# Patient Record
Sex: FEMALE | Race: ASIAN | NOT HISPANIC OR LATINO | Employment: UNEMPLOYED | URBAN - METROPOLITAN AREA
[De-identification: names, ages, dates, MRNs, and addresses within clinical notes are randomized per-mention and may not be internally consistent; named-entity substitution may affect disease eponyms.]

---

## 2020-04-27 ENCOUNTER — VIRTUAL VISIT (OUTPATIENT)
Dept: PEDIATRICS | Facility: CLINIC | Age: 35
End: 2020-04-27
Payer: COMMERCIAL

## 2020-04-27 DIAGNOSIS — M54.41 ACUTE MIDLINE LOW BACK PAIN WITH RIGHT-SIDED SCIATICA: Primary | ICD-10-CM

## 2020-04-27 PROCEDURE — 99203 OFFICE O/P NEW LOW 30 MIN: CPT | Mod: 95 | Performed by: PHYSICIAN ASSISTANT

## 2020-04-27 RX ORDER — CYCLOBENZAPRINE HCL 10 MG
10 TABLET ORAL 3 TIMES DAILY PRN
Qty: 15 TABLET | Refills: 0 | Status: SHIPPED | OUTPATIENT
Start: 2020-04-27 | End: 2020-08-26

## 2020-04-27 NOTE — PROGRESS NOTES
"Kirstin Green is a 34 year old female who is being evaluated via a billable video visit.      The patient has been notified of following:     \"This video visit will be conducted via a call between you and your physician/provider. We have found that certain health care needs can be provided without the need for an in-person physical exam.  This service lets us provide the care you need with a video conversation.  If a prescription is necessary we can send it directly to your pharmacy.  If lab work is needed we can place an order for that and you can then stop by our lab to have the test done at a later time.    Video visits are billed at different rates depending on your insurance coverage.  Please reach out to your insurance provider with any questions.    If during the course of the call the physician/provider feels a video visit is not appropriate, you will not be charged for this service.\"    Patient has given verbal consent for Video visit? Yes    How would you like to obtain your AVS? [email or web address]    Patient would like the video invitation sent by: Text to cell phone: [phone]    Will anyone else be joining your video visit?         Subjective     Kirstin Green is a 34 year old female who presents to clinic today for the following health issues:    HPI  Back Pain       Duration: 5 days          Specific cause: lifting--has young child    Description:   Location of pain: low back right  Character of pain: sharp and stabbing  Pain radiation:radiates into the right buttocks  New numbness or weakness in legs, not attributed to pain:  no     Intensity: Currently 5/10, moderate    History:   Pain interferes with job: YES--with ADL  History of back problems: recurrent self limited episodes of low back pain in the past  Any previous MRI or X-rays: None  Sees a specialist for back pain:  No  Therapies tried without relief: ibuprofen 200 mg and muscle pain balm--OTC    Alleviating factors:   Improved by: " nothing      Precipitating factors:  Worsened by: Lifting, Bending, Standing and cough, changing positions    Accompanying Signs & Symptoms:  Risk of Fracture:  None  Risk of Cauda Equina:  None  Risk of Infection:  None  Risk of Cancer:  None  Risk of Ankylosing Spondylitis:  Onset at age <35, male, AND morning back stiffness. 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     Review of Systems         Objective    There were no vitals taken for this visit.  There is no height or weight on file to calculate BMI.  Physical Exam     GENERAL: healthy, alert and no distress  EYES: Eyes grossly normal to inspection, conjunctivae and sclerae normal  PSYCH: mentation appears normal, affect normal/bright, judgement and insight intact, normal speech and appearance well-groomed    Diagnostic Test Results:  No results found for this or any previous visit (from the past 24 hour(s)).        Assessment & Plan   1. Acute midline low back pain with right-sided sciatica  Begin ibuprofen 400-600 mg three times daily. Heat at site. Modification of activities including lifting restrictions. Exercises sent via [employer].   Flexeril PRN. Call in two weeks if symptoms persist.   - cyclobenzaprine (FLEXERIL) 10 MG tablet; Take 1 tablet (10 mg) by mouth 3 times daily as needed  Dispense: 15 tablet; Refill: 0     Antoinette Christie PA-C  [facility]      Video-Visit Details    Type of service:  Video Visit    Video End Time: 11:17 AM    Originating Location (pt. Location): Home    Distant Location (provider location):  [facility]     Mode of Communication:  Video Conference via [employer]

## 2020-06-05 ENCOUNTER — OFFICE VISIT (OUTPATIENT)
Dept: OBGYN | Facility: CLINIC | Age: 35
End: 2020-06-05
Payer: COMMERCIAL

## 2020-06-05 VITALS — DIASTOLIC BLOOD PRESSURE: 62 MMHG | WEIGHT: 153 LBS | SYSTOLIC BLOOD PRESSURE: 114 MMHG

## 2020-06-05 DIAGNOSIS — L68.0 HIRSUTISM: Primary | ICD-10-CM

## 2020-06-05 PROCEDURE — 99202 OFFICE O/P NEW SF 15 MIN: CPT | Performed by: ADVANCED PRACTICE MIDWIFE

## 2020-06-05 SDOH — HEALTH STABILITY: MENTAL HEALTH: HOW OFTEN DO YOU HAVE A DRINK CONTAINING ALCOHOL?: NEVER

## 2020-06-05 NOTE — NURSING NOTE
Chief Complaint   Patient presents with     Hair Loss     hair loss on head, hair growth on chin       Initial /62 (BP Location: Right arm, Cuff Size: Adult Regular)   Wt 69.4 kg (153 lb)   LMP 05/05/2020   Breastfeeding Yes  There is no height or weight on file to calculate BMI.  BP completed using cuff size: regular    Questioned patient about current smoking habits.  Pt. has never smoked.

## 2020-06-05 NOTE — PROGRESS NOTES
SUBJECTIVE:                                                   Kirstin Green is a 34 year old who presents to clinic today for the following health issue(s):  Patient presents with:  Hair Loss: hair loss on head, hair growth on chin      HPI:  Patient presents with a few month history of increased facial hair, and increased male patter hair loss. Patient has not noted any other symptoms. She has a history of PCOS. Has taken birth control pills in the past to regulate her menses. Does not want to take them now as she is breastfeeding.    Patient's last menstrual period was 2020.  Menstrual History: frequency: every 28-30 days  Patient is sexually active, but reports low libido  .  Using tubal ligation for contraception.   Health maintenance updated:  yes    Last PHQ-9 score on record = No flowsheet data found.  Last GAD7 score on record = No flowsheet data found.      Problem list and histories reviewed & adjusted, as indicated.  Additional history: as documented.    There is no problem list on file for this patient.    Past Surgical History:   Procedure Laterality Date     TUBAL LIGATION  2018      Social History     Tobacco Use     Smoking status: Never Smoker     Smokeless tobacco: Never Used   Substance Use Topics     Alcohol use: Never     Frequency: Never           Current Outpatient Medications   Medication Sig     cyclobenzaprine (FLEXERIL) 10 MG tablet Take 1 tablet (10 mg) by mouth 3 times daily as needed (Patient not taking: Reported on 2020)     No current facility-administered medications for this visit.      Allergies   Allergen Reactions     Quinolones Rash       ROS:  CONSTITUTIONAL: NEGATIVE for fever, chills, change in weight  BREAST: NEGATIVE for masses, tenderness or discharge  GI: NEGATIVE for nausea, abdominal pain, heartburn, or change in bowel habits  : NEGATIVE for unusual urinary or vaginal symptoms. Periods are regular.  NEURO: NEGATIVE for weakness, dizziness or  paresthesias  ENDOCRINE: NEGATIVE for temperature intolerance  POSITIVE for increased facial hair, and increased male pattern hair loss  HEME/ALLERGY/IMMUNE: NEGATIVE for bleeding problems  PSYCHIATRIC: NEGATIVE for changes in mood or affect    OBJECTIVE:     /62 (BP Location: Right arm, Cuff Size: Adult Regular)   Wt 69.4 kg (153 lb)   LMP 05/05/2020   Breastfeeding Yes   There is no height or weight on file to calculate BMI.    PHYSICAL EXAM:  Constitutional:  Appearance: Well nourished, well developed alert, in no acute distress  Chest:  Respiratory Effort:  Breathing unlabored.   Neurologic:  Mental Status:  Oriented X3.  Normal strength and tone, sensory exam grossly normal, mentation intact and speech normal.    Psychiatric:  Mentation appears normal and affect normal/bright.     In-Clinic Test Results:  No results found for this or any previous visit (from the past 24 hour(s)).    ASSESSMENT/PLAN:                                                        ICD-10-CM    1. Hirsutism  L68.0 US Pelvic Complete w Transvaginal     **Testosterone Free and Total FUTURE anytime     Cortisol     DHEA sulfate     Prolactin     TSH with free T4 reflex     CANCELED: **Testosterone Free and Total FUTURE anytime     CANCELED: Cortisol - AM ([facility])     CANCELED: TSH with free T4 reflex     CANCELED: Prolactin     CANCELED: DHEA sulfate     PLAN:    Labs and ultrasound pending. Will advise patient of all results when available.    Encouraged patient to call with any questions or concerns.      SYLVIE Romo, CNM

## 2020-06-08 DIAGNOSIS — L68.0 HIRSUTISM: ICD-10-CM

## 2020-06-08 LAB
CORTIS SERPL-MCNC: 6.3 UG/DL (ref 4–22)
PROLACTIN SERPL-MCNC: 14 UG/L (ref 3–27)
TSH SERPL DL<=0.005 MIU/L-ACNC: 3.28 MU/L (ref 0.4–4)

## 2020-06-08 PROCEDURE — 84146 ASSAY OF PROLACTIN: CPT | Performed by: ADVANCED PRACTICE MIDWIFE

## 2020-06-08 PROCEDURE — 84270 ASSAY OF SEX HORMONE GLOBUL: CPT | Performed by: ADVANCED PRACTICE MIDWIFE

## 2020-06-08 PROCEDURE — 84443 ASSAY THYROID STIM HORMONE: CPT | Performed by: ADVANCED PRACTICE MIDWIFE

## 2020-06-08 PROCEDURE — 84403 ASSAY OF TOTAL TESTOSTERONE: CPT | Performed by: ADVANCED PRACTICE MIDWIFE

## 2020-06-08 PROCEDURE — 82627 DEHYDROEPIANDROSTERONE: CPT | Performed by: ADVANCED PRACTICE MIDWIFE

## 2020-06-08 PROCEDURE — 36415 COLL VENOUS BLD VENIPUNCTURE: CPT | Performed by: ADVANCED PRACTICE MIDWIFE

## 2020-06-08 PROCEDURE — 82533 TOTAL CORTISOL: CPT | Performed by: ADVANCED PRACTICE MIDWIFE

## 2020-06-09 LAB — DHEA-S SERPL-MCNC: 261 UG/DL (ref 35–430)

## 2020-06-10 LAB
SHBG SERPL-SCNC: 45 NMOL/L (ref 30–135)
TESTOST FREE SERPL-MCNC: 0.39 NG/DL (ref 0.13–0.92)
TESTOST SERPL-MCNC: 27 NG/DL (ref 8–60)

## 2020-06-24 ENCOUNTER — ANCILLARY PROCEDURE (OUTPATIENT)
Dept: ULTRASOUND IMAGING | Facility: CLINIC | Age: 35
End: 2020-06-24
Attending: ADVANCED PRACTICE MIDWIFE
Payer: COMMERCIAL

## 2020-06-24 PROCEDURE — 76856 US EXAM PELVIC COMPLETE: CPT | Performed by: FAMILY MEDICINE

## 2020-06-24 PROCEDURE — 76830 TRANSVAGINAL US NON-OB: CPT | Performed by: FAMILY MEDICINE

## 2020-06-26 ENCOUNTER — TELEPHONE (OUTPATIENT)
Dept: OBGYN | Facility: CLINIC | Age: 35
End: 2020-06-26

## 2020-06-26 DIAGNOSIS — E28.2 PCOS (POLYCYSTIC OVARIAN SYNDROME): Primary | ICD-10-CM

## 2020-06-26 DIAGNOSIS — N83.201 RIGHT OVARIAN CYST: ICD-10-CM

## 2020-06-26 RX ORDER — DESOGESTREL AND ETHINYL ESTRADIOL 0.15-0.03
1 KIT ORAL DAILY
Qty: 28 TABLET | Refills: 2 | Status: SHIPPED | OUTPATIENT
Start: 2020-06-26 | End: 2020-09-30

## 2020-06-26 NOTE — TELEPHONE ENCOUNTER
Called patient and advised that all of her labs were normal, and her ultrasound indicates PCOS. This is the likely cause of her facial hair and hair loss on her scalp. Also advised that she had a cyst on her right ovary that appeared benign, but follow up recommended to ensure resolution. Advised patient to schedule follow up ultrasound in 6-8 weeks.    Discussed option for treatment of PCOS. Patient decided that she would like to try a course of OCPs. Prescription sent to pharmacy. Patient has upcoming physical scheduled for follow up. Encouraged patient to call with any questions or concerns.      SYLVIE Romo, CNM

## 2020-08-07 ENCOUNTER — OFFICE VISIT (OUTPATIENT)
Dept: DERMATOLOGY | Facility: CLINIC | Age: 35
End: 2020-08-07
Payer: COMMERCIAL

## 2020-08-07 VITALS — OXYGEN SATURATION: 99 % | HEART RATE: 76 BPM | DIASTOLIC BLOOD PRESSURE: 59 MMHG | SYSTOLIC BLOOD PRESSURE: 95 MMHG

## 2020-08-07 DIAGNOSIS — L64.9 ANDROGENIC ALOPECIA: Primary | ICD-10-CM

## 2020-08-07 PROCEDURE — 99213 OFFICE O/P EST LOW 20 MIN: CPT | Performed by: PHYSICIAN ASSISTANT

## 2020-08-07 NOTE — PATIENT INSTRUCTIONS
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 Minoxidil - this is OTC  2. Spironolactone - blood pressure/anti-testosterone pill. Helps with unwanted hair growth too  3. Finasteride (propecia) - daily pill

## 2020-08-07 NOTE — PROGRESS NOTES
HPI:   Chief complaints: Kirstin Green is a 34 year old female who presents for evaluation of hair loss. She has noticed thinning along the front of her scalp for the past 3 years. The density seems to be the same at the back of the scalp and on the sides. NO pain on the scalp.   She has also noticed increased hair growth on the face which has been happening for about the same amount of time.   Condition present for:  3 years.   Previous treatments include: none  -pmhx: recent US which revealed likely PCOS. Labs were normal.   She is still nursing; has a 19 month old and a 9 year old    Review Of Systems  Eyes: negative  Ears/Nose/Throat: negative  Respiratory: No shortness of breath, dyspnea on exertion, cough, or hemoptysis  Cardiovascular: negative  Gastrointestinal: negative  Genitourinary: negative  Musculoskeletal: negative  Neurologic: negative  Psychiatric: negative  Skin: positive for hair loss       PHYSICAL EXAM:    BP 95/59   Pulse 76   SpO2 99%   Breastfeeding Yes   Skin exam performed as follows: Type 4 skin. Mood appropriate  Alert and Oriented X 3. Well developed, well nourished in no distress.  General appearance: Normal  Head including face: Normal  Eyes: conjunctiva and lids: Normal  Mouth: Lips, teeth, gums: Normal  Neck: Normal  Chest-breast/axillae: Normal  Back: Normal  Spleen and liver: Normal  Cardiovascular: Exam of peripheral vascular system by observation for swelling, varicosities, edema: Normal  Genitalia: groin, buttocks: Normal  Extremities: digits/nails (clubbing): Normal  Eccrine and Apocrine glands: Normal  Right upper extremity: Normal  Left upper extremity: Normal  Right lower extremity: Normal  Left lower extremity: Normal  Skin: Scalp and body hair: See below    1. Decreased density through vertex scalp. Scalp normal without erythema or scaling. Negative hair pull.     ASSESSMENT/PLAN:     1. Female pattern hair loss. Advised on natural course and progression secondary to  hormones and genetics. Discussed options for treatment including OTC minoxidil, spironolactone and finasteride. She is still nursing; advised she can call for medications once she is done with this.   2. Hirsutism - discussed that spironolactone may help with this.         Follow-up: yearly  CC:   Scribed By: Ayala Fung, MS, PA-C

## 2020-08-26 ENCOUNTER — OFFICE VISIT (OUTPATIENT)
Dept: PEDIATRICS | Facility: CLINIC | Age: 35
End: 2020-08-26
Payer: COMMERCIAL

## 2020-08-26 VITALS
HEART RATE: 103 BPM | OXYGEN SATURATION: 98 % | RESPIRATION RATE: 16 BRPM | SYSTOLIC BLOOD PRESSURE: 108 MMHG | DIASTOLIC BLOOD PRESSURE: 52 MMHG | TEMPERATURE: 98 F | WEIGHT: 158 LBS

## 2020-08-26 DIAGNOSIS — M54.42 ACUTE LEFT-SIDED LOW BACK PAIN WITH LEFT-SIDED SCIATICA: ICD-10-CM

## 2020-08-26 PROCEDURE — 99213 OFFICE O/P EST LOW 20 MIN: CPT | Performed by: PHYSICIAN ASSISTANT

## 2020-08-26 RX ORDER — CYCLOBENZAPRINE HCL 10 MG
10 TABLET ORAL 3 TIMES DAILY PRN
Qty: 15 TABLET | Refills: 0 | Status: SHIPPED | OUTPATIENT
Start: 2020-08-26 | End: 2021-11-03

## 2020-08-26 NOTE — PROGRESS NOTES
Subjective     Kirstin Green is a 35 year old female who presents to clinic today for the following health issues:    HPI   Musculoskeletal problem/pain  Onset/Duration: April 2020   Description  Location: lower back down into L leg  Joint Swelling: no  Redness: no  Pain: YES  Warmth: no  Intensity:  moderate  Progression of Symptoms:  worsening and intermittent  Accompanying signs and symptoms:   Fevers: no  Numbness/tingling/weakness: tingling  History  Trauma to the area: no  Recent illness:  no  Previous similar problem: YES  Previous evaluation:  no  Precipitating or alleviating factors:  Aggravating factors include: none and exercise  Therapies tried and outcome: nothing and Ibuprofen    Review of Systems   Constitutional, HEENT, cardiovascular, pulmonary, gi and gu systems are negative, except as otherwise noted.      Objective    /52   Pulse 103   Temp 98  F (36.7  C) (Tympanic)   Resp 16   Wt 71.7 kg (158 lb)   SpO2 98%   There is no height or weight on file to calculate BMI.  Physical Exam   ORTHO: Lumber/Thoracic Spine Exam: Tender:  lumbar spinous processes, left para lumbar muscles  Range of Motion:  lumbar flexion  decreased, left lateral lumbar bending  decreased, right lateral lumbar bending  decreased  Strength:  Full  Special tests:  positive left straight leg raise    No results found for this or any previous visit (from the past 24 hour(s)).        Assessment & Plan   (M54.42) Acute left-sided low back pain with left-sided sciatica  Comment: begin good mechanics. Heat/ice, gentle stretches. Ibuprofen. Flexeril PRN. Call if symptoms persist or worsen.  Plan: cyclobenzaprine (FLEXERIL) 10 MG tablet          Antoinette Christie PA-C  [facility]

## 2020-09-21 ENCOUNTER — TELEPHONE (OUTPATIENT)
Dept: OBGYN | Facility: CLINIC | Age: 35
End: 2020-09-21

## 2020-09-21 DIAGNOSIS — O92.29 PAIN OF BREAST DURING BREASTFEEDING: Primary | ICD-10-CM

## 2020-09-21 DIAGNOSIS — Z91.89 BREASTFEEDING PROBLEM: Primary | ICD-10-CM

## 2020-09-21 RX ORDER — BACITRACIN ZINC 500 [USP'U]/G
OINTMENT TOPICAL 2 TIMES DAILY
Qty: 1 G | Refills: 0 | Status: SHIPPED | OUTPATIENT
Start: 2020-09-21 | End: 2021-11-03

## 2020-09-21 RX ORDER — MODIFIED LANOLIN
OINTMENT (GRAM) TOPICAL
Qty: 7 G | Refills: 1 | Status: SHIPPED | OUTPATIENT
Start: 2020-09-21 | End: 2021-11-03

## 2020-09-21 NOTE — TELEPHONE ENCOUNTER
Patient would like call back regarding painful, swollen,cracked nipples while breast feeding. Scheduled appt. 9/24 with midwife.

## 2020-09-21 NOTE — TELEPHONE ENCOUNTER
Called and spoke with pt. She is currently 20 months postpartum, breastfeeding her infant only in evenings, has been working on weaning. Has been feeding only on the right side. For the past few days has noticed that the nipple on that side is sore and cracked. Denies any exudate or drainage. Denies any redness of breast but reports some redness of nipple. Denies fevers, chills, swollen/painful lumps. Infant is feeding normally, no observed discomfort or white patches in infant's mouth. Has not tried putting anything on nipple yet. Recommended bacitracin to affected nipple twice a day as well as lanolin ointment after feeds. Can also use tylenol or ibuprofen, heat/cool application as desired. Reviewed s/sx of infection, pt to call with new/worsening symptoms. Has appointment scheduled for 9/24.     PREET Wlal CNM 9/21/2020 11:37 AM

## 2020-09-24 ENCOUNTER — OFFICE VISIT (OUTPATIENT)
Dept: OBGYN | Facility: CLINIC | Age: 35
End: 2020-09-24
Payer: COMMERCIAL

## 2020-09-24 VITALS — SYSTOLIC BLOOD PRESSURE: 112 MMHG | WEIGHT: 158 LBS | DIASTOLIC BLOOD PRESSURE: 56 MMHG

## 2020-09-24 DIAGNOSIS — Z23 NEED FOR PROPHYLACTIC VACCINATION AND INOCULATION AGAINST INFLUENZA: ICD-10-CM

## 2020-09-24 DIAGNOSIS — Z91.89 BREASTFEEDING PROBLEM: Primary | ICD-10-CM

## 2020-09-24 PROCEDURE — 90686 IIV4 VACC NO PRSV 0.5 ML IM: CPT | Performed by: ADVANCED PRACTICE MIDWIFE

## 2020-09-24 PROCEDURE — 99213 OFFICE O/P EST LOW 20 MIN: CPT | Mod: 25 | Performed by: ADVANCED PRACTICE MIDWIFE

## 2020-09-24 PROCEDURE — 90471 IMMUNIZATION ADMIN: CPT | Performed by: ADVANCED PRACTICE MIDWIFE

## 2020-09-24 NOTE — PROGRESS NOTES
SUBJECTIVE:                                                   Kirstin Green is a 35 year old who presents to clinic today for the following health issue(s):  Patient presents with:  Breast Problem: cuts and swelling on rt nipple, lanolin and bacitracin have decreased swelling  Imm/Inj: Flu Shot      HPI:  Here with 2 cracks, mild swelling of her right nipple, she is breastfeeding her 20 month old, trying to wean, only feeds once at night currently. Denies any fevers, chills, drainage, lesions. Infant has been feeding normally, no white patches or sx of infection. Called on 9/22 and had a prescription for antibiotic ointment and lanolin rx'd to pharmacy, she reports that the swelling has improved after this.     Patient's last menstrual period was 08/05/2020.     Problem list and histories reviewed & adjusted, as indicated.  Additional history: as documented.    There is no problem list on file for this patient.    Past Surgical History:   Procedure Laterality Date     TUBAL LIGATION  12/2018      Social History     Tobacco Use     Smoking status: Never Smoker     Smokeless tobacco: Never Used   Substance Use Topics     Alcohol use: Never     Frequency: Never      Problem (# of Occurrences) Relation (Name,Age of Onset)    Breast Cancer (1) Maternal Grandmother            Current Outpatient Medications   Medication Sig     bacitracin 500 UNIT/GM external ointment Apply topically 2 times daily Apply following breastfeeding and allow to absorb prior to next feed.     desogestrel-ethinyl estradiol (APRI) 0.15-30 MG-MCG tablet Take 1 tablet by mouth daily     lanolin ointment Apply topically every hour as needed for dry skin     cyclobenzaprine (FLEXERIL) 10 MG tablet Take 1 tablet (10 mg) by mouth 3 times daily as needed (Patient not taking: Reported on 9/24/2020)     No current facility-administered medications for this visit.      Allergies   Allergen Reactions     Quinolones Rash       ROS:  CONSTITUTIONAL: NEGATIVE  for fever, chills, change in weight  INTEGUMENTARU/SKIN: NEGATIVE for worrisome rashes, moles or lesions  BREAST: NEGATIVE for masses, tenderness or discharge, positive for cracking of skin on right nipple  : NEGATIVE for unusual urinary or vaginal symptoms. Periods are regular.  NEURO: NEGATIVE for weakness, dizziness or paresthesias  PSYCHIATRIC: NEGATIVE for changes in mood or affect    OBJECTIVE:     /56 (BP Location: Left arm, Cuff Size: Adult Regular)   Wt 71.7 kg (158 lb)   LMP 08/05/2020   Breastfeeding Yes   There is no height or weight on file to calculate BMI.    PHYSICAL EXAM:  Constitutional:  Appearance: Well nourished, well developed alert, in no acute distress  Breasts:  Inspection of Breasts:  Symmetric bilaterally.  No puckering.  No skin changes.  Palpation of Breasts and Axillae:  No masses present on palpation, no breast tenderness Axillary Lymph Nodes:  No lymphadenopathy present. 2 small, superficial cracks on right nipple. No bleeding, no drainage, warmth or erythema. Tenderness to palpation.  Psychiatric:  Mentation appears normal and affect normal/bright.     In-Clinic Test Results:  No results found for this or any previous visit (from the past 24 hour(s)).    ASSESSMENT/PLAN:                                                        ICD-10-CM    1. Breastfeeding problem  Z91.89    2. Need for prophylactic vaccination and inoculation against influenza  Z23 INFLUENZA VACCINE IM > 6 MONTHS VALENT IIV4 [06612]     Vaccine Administration, Initial [90296]       PLAN:  Continue with antibiotic ointment, sx improving  Reviewed methods for helping to wean infant   Reviewed s/sx of infection, return precautions discussed    PREET Wall CNM 9/24/2020 4:59 PM

## 2020-09-24 NOTE — NURSING NOTE
Chief Complaint   Patient presents with     Breast Problem     cuts and swelling on rt nipple, lanolin and bacitracin have decreased swelling     Imm/Inj     Flu Shot       Initial /56 (BP Location: Left arm, Cuff Size: Adult Regular)   Wt 71.7 kg (158 lb)   LMP 2020   Breastfeeding Yes  There is no height or weight on file to calculate BMI.  BP completed using cuff size: regular    Questioned patient about current smoking habits.  Pt. has never smoked.          The following HM Due: NONE  Marleny Mendoza CMA    
no

## 2020-09-29 ENCOUNTER — TELEPHONE (OUTPATIENT)
Dept: OBGYN | Facility: CLINIC | Age: 35
End: 2020-09-29

## 2020-09-29 DIAGNOSIS — E28.2 PCOS (POLYCYSTIC OVARIAN SYNDROME): ICD-10-CM

## 2020-09-29 NOTE — TELEPHONE ENCOUNTER
isibloom      Last Written Prescription Date:  6/26/20  Last Fill Quantity: 28,   # refills: 2  Last Office Visit: 9/24/20  Future Office visit:

## 2020-09-29 NOTE — TELEPHONE ENCOUNTER
"Requested Prescriptions   Pending Prescriptions Disp Refills     desogestrel-ethinyl estradiol (APRI) 0.15-30 MG-MCG tablet 28 tablet 2     Sig: Take 1 tablet by mouth daily       Contraceptives Protocol Passed - 9/29/2020  1:31 PM        Passed - Patient is not a current smoker if age is 35 or older        Passed - Recent (12 mo) or future (30 days) visit within the authorizing provider's specialty     Patient has had an office visit with the authorizing provider or a provider within the authorizing providers department within the previous 12 mos or has a future within next 30 days. See \"Patient Info\" tab in inbasket, or \"Choose Columns\" in Meds & Orders section of the refill encounter.              Passed - Medication is active on med list        Passed - No active pregnancy on record        Passed - No positive pregnancy test in past 12 months           LM to see if pt is breastfeeding.    Evelia BRITO R.N.        "

## 2020-09-30 RX ORDER — DESOGESTREL AND ETHINYL ESTRADIOL 0.15-0.03
1 KIT ORAL DAILY
Qty: 28 TABLET | Refills: 2 | Status: SHIPPED | OUTPATIENT
Start: 2020-09-30 | End: 2021-01-13

## 2020-09-30 NOTE — TELEPHONE ENCOUNTER
Fax received from [employer].  [facility]om is not covered without PA.  Is there an alternative or do you want to start a PA?    Opal Mayers RN

## 2020-10-05 ENCOUNTER — TELEPHONE (OUTPATIENT)
Dept: PEDIATRICS | Facility: CLINIC | Age: 35
End: 2020-10-05

## 2020-10-05 DIAGNOSIS — M54.42 ACUTE LEFT-SIDED LOW BACK PAIN WITH LEFT-SIDED SCIATICA: Primary | ICD-10-CM

## 2020-10-05 NOTE — TELEPHONE ENCOUNTER
Call patient. Referral placed. If no improvement in symptoms with physical therapy, will refer to ortho.     Antoinette Christie PA-C

## 2020-10-05 NOTE — TELEPHONE ENCOUNTER
ML for pt. Regarding Debora BOYD's note. Advised pt. To call back with any questions or concerns  Lulu Villalobos MA on 10/5/2020 at 12:18 PM

## 2020-10-05 NOTE — TELEPHONE ENCOUNTER
Any generic equivalent to desogestrel-ethinyl estradiol 0.15-30MG-MCG would be appropriate.      Thank you,    SYLVIE Romo, MARYJANEM

## 2020-10-05 NOTE — TELEPHONE ENCOUNTER
Order/Referral Request:    Who is requesting: patient/patient's     Orders being requested: Referral for physical therapy    Reason service is needed/diagnosis: Continued back pain    When are orders needed by: at convenience    Has this been discussed with Provider: Yes    Does patient have a preference on a Group/Provider/Facility? n    Does patient have an appointment scheduled: No - had appt. 8/26/20 with Cheryl Christie PA-C    Where to send Orders: [facility]    Okay to leave detailed message?  Yes at Home number on file [phone] (home)

## 2020-10-05 NOTE — TELEPHONE ENCOUNTER
Patients  called stating that pt is still having back issue and would like to get a referral for Physical therapy. Caller stated that pt was advised to call back and request the referral if the back pain does not get better. Please reach out to pt for concerns.     Tricia Brandon on 10/5/2020 at 10:27 AM

## 2020-10-06 NOTE — TELEPHONE ENCOUNTER
Patient returned call.  Provided patient with scheduling number for PT, and transferred for scheduling.    Rosie Hirsch

## 2020-10-07 ENCOUNTER — THERAPY VISIT (OUTPATIENT)
Dept: PHYSICAL THERAPY | Facility: CLINIC | Age: 35
End: 2020-10-07
Attending: PHYSICIAN ASSISTANT
Payer: COMMERCIAL

## 2020-10-07 DIAGNOSIS — G89.29 CHRONIC BILATERAL LOW BACK PAIN WITH LEFT-SIDED SCIATICA: Primary | ICD-10-CM

## 2020-10-07 DIAGNOSIS — M54.42 ACUTE LEFT-SIDED LOW BACK PAIN WITH LEFT-SIDED SCIATICA: ICD-10-CM

## 2020-10-07 DIAGNOSIS — M54.42 CHRONIC BILATERAL LOW BACK PAIN WITH LEFT-SIDED SCIATICA: Primary | ICD-10-CM

## 2020-10-07 PROCEDURE — 97110 THERAPEUTIC EXERCISES: CPT | Mod: GP | Performed by: PHYSICAL THERAPIST

## 2020-10-07 PROCEDURE — 97161 PT EVAL LOW COMPLEX 20 MIN: CPT | Mod: GP | Performed by: PHYSICAL THERAPIST

## 2020-10-07 NOTE — PROGRESS NOTES
[facility] Initial Evaluation  Subjective:  The history is provided by the patient. No  was used.   Patient Health History    Problem began: 4/1/2020.   Problem occurred: Pt was carrying child (22mos) and heard a sound in her back and felt pain immediately in R side of low back.  Pt was prescribed muscle relaxants and things settled down after 1-2 months.  Pt then went to the gym and did some treadmill and skipping and noticed pain the next morning down the L leg to her ankle.  Pt now primarily has sympotms when carrying her son, located L side low back and L posterior thigh.   Pain is reported as 9/10 on pain scale.  General health as reported by patient is good.     Red flags:  None as reported by patient.             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                     Therapist Generated HPI Evaluation  Problem details: Previous history of low back pain during second pregnancy.         Type of problem:  Lumbar.      Condition occurred with:  Insidious onset.    Patient reports pain:  SI joint left.    Pain radiates to:  Thigh left.     Associated symptoms:  Numbness. Symptoms are exacerbated by carrying and sitting  Relieved by: lying down.                              Objective:  System    Physical Exam      [place] Lumbar Evaluation    Posture:  Sitting: fair  Standing: fair  Lordosis: WNL  Lateral Shift: no  Correction of Posture: no effect    Movement Loss:  Flexion (Flex): min  Extension (EXT): mod  Side Glide R (SG R): min  Side Glide L (SG L): mod and pain  Test Movements:        EIL:   Repeat EIL: During: produces  After: no worse  Mechanical Response: IncROM        Conclusion: derangement  Principle of Treatment:      Extension: REIL with hips offset to the R - decreased pulling in L posterior thigh during forward flexion afterwards                                           ROS    Assessment/Plan:    Patient is a 35 year old  female with lumbar complaints.    Patient has the following significant findings with corresponding treatment plan.                Diagnosis 1:  Low back and L leg pain      Pain -  hot/cold therapy, manual therapy, self management, education, directional preference exercise and home program  Decreased ROM/flexibility - manual therapy and therapeutic exercise  Decreased strength - therapeutic exercise and therapeutic activities  Impaired muscle performance - neuro re-education  Decreased function - therapeutic activities  Impaired posture - neuro re-education    Therapy Evaluation Codes:   1) History comprised of:   Personal factors that impact the plan of care:      Gender, Language and Time since onset of symptoms.    Comorbidity factors that impact the plan of care are:      None.     Medications impacting care: None.  2) Examination of Body Systems comprised of:   Body structures and functions that impact the plan of care:      Hip, Knee and Lumbar spine.   Activity limitations that impact the plan of care are:      Bending, Cooking, Lifting and Sitting.  3) Clinical presentation characteristics are:   Evolving/Changing.  4) Decision-Making    Low complexity using standardized patient assessment instrument and/or measureable assessment of functional outcome.  Cumulative Therapy Evaluation is: Low complexity.    Previous and current functional limitations:  (See Goal Flow Sheet for this information)    Short term and Long term goals: (See Goal Flow Sheet for this information)     Communication ability:  Patient appears to be able to clearly communicate and understand verbal and written communication and follow directions correctly.  Treatment Explanation - The following has been discussed with the patient:   RX ordered/plan of care  Anticipated outcomes  Possible risks and side effects  This patient would benefit from PT intervention to resume normal activities.   Rehab potential is good.    Frequency:  1 X week,  once daily  Duration:  for 6 weeks  Discharge Plan:  Achieve all LTG.  Independent in home treatment program.  Reach maximal therapeutic benefit.    Please refer to the daily flowsheet for treatment today, total treatment time and time spent performing 1:1 timed codes.

## 2020-10-21 DIAGNOSIS — N83.201 RIGHT OVARIAN CYST: ICD-10-CM

## 2021-01-04 ENCOUNTER — HEALTH MAINTENANCE LETTER (OUTPATIENT)
Age: 36
End: 2021-01-04

## 2021-01-13 DIAGNOSIS — E28.2 PCOS (POLYCYSTIC OVARIAN SYNDROME): ICD-10-CM

## 2021-01-13 RX ORDER — DESOGESTREL AND ETHINYL ESTRADIOL 0.15-0.03
1 KIT ORAL DAILY
Qty: 84 TABLET | Refills: 3 | Status: SHIPPED | OUTPATIENT
Start: 2021-01-13 | End: 2021-11-03

## 2021-01-13 NOTE — TELEPHONE ENCOUNTER
Prescription approved per [facility] Refill Protocol.    Opal Mayers RN

## 2021-01-13 NOTE — TELEPHONE ENCOUNTER
Isibloom 0.15MG - 0.03 tabs 28S      Last Written Prescription Date:  Last refilled 12/15/2020  Last Fill Quantity: 28,   # refills: 2  Last Office Visit: 9/24/20  Future Office visit:   None scheduled    Claritza Borjas CMA

## 2021-04-09 ENCOUNTER — TELEPHONE (OUTPATIENT)
Dept: PEDIATRICS | Facility: CLINIC | Age: 36
End: 2021-04-09

## 2021-04-09 NOTE — TELEPHONE ENCOUNTER
The Pt and her  called in (I did receive verbal permission from the Pt it was ok to speak with her  about medical information).   The Pt wanted to know if it is safe to receive the Covid-19 vaccine while breast feeding. I did advise her our OB providers are advising breastfeeding moms to receive the vaccine.     No further questions or concerns at this time.     Pebbles Brennan RN   [facility] -- Triage Nurse

## 2021-04-28 ENCOUNTER — IMMUNIZATION (OUTPATIENT)
Dept: NURSING | Facility: CLINIC | Age: 36
End: 2021-04-28
Payer: COMMERCIAL

## 2021-04-28 PROCEDURE — 0001A PR COVID VAC PFIZER DIL RECON 30 MCG/0.3 ML IM: CPT

## 2021-04-28 PROCEDURE — 91300 PR COVID VAC PFIZER DIL RECON 30 MCG/0.3 ML IM: CPT

## 2021-05-19 ENCOUNTER — IMMUNIZATION (OUTPATIENT)
Dept: NURSING | Facility: CLINIC | Age: 36
End: 2021-05-19
Attending: INTERNAL MEDICINE
Payer: COMMERCIAL

## 2021-05-19 PROCEDURE — 0002A PR COVID VAC PFIZER DIL RECON 30 MCG/0.3 ML IM: CPT

## 2021-05-19 PROCEDURE — 91300 PR COVID VAC PFIZER DIL RECON 30 MCG/0.3 ML IM: CPT

## 2021-08-26 ENCOUNTER — VIRTUAL VISIT (OUTPATIENT)
Dept: FAMILY MEDICINE | Facility: OTHER | Age: 36
End: 2021-08-26
Payer: COMMERCIAL

## 2021-08-26 DIAGNOSIS — J06.9 VIRAL URI WITH COUGH: Primary | ICD-10-CM

## 2021-08-26 PROCEDURE — 99213 OFFICE O/P EST LOW 20 MIN: CPT | Mod: 95 | Performed by: FAMILY MEDICINE

## 2021-08-26 ASSESSMENT — PAIN SCALES - GENERAL: PAINLEVEL: NO PAIN (0)

## 2021-08-26 NOTE — PROGRESS NOTES
Kirstin is a 36 year old who is being evaluated via a billable telephone visit.      What phone number would you like to be contacted at? [phone]    How would you like to obtain your AVS? MyChart    Assessment & Plan     1. Viral URI with cough  Symptoms consistent with possible viral URI.  No red flag signs.  Son has recently been tested for COVID and was negative.  Discussed symptomatic care  Reportable signs and symptoms discussed.  RTC if symptoms persist or fail to improve      Lizbeth Lynne MD  [facility]   Kirstin is a 36 year old who presents for the following health issues     HPI     Acute Illness  Acute illness concerns:   Onset/Duration: 2 days   Symptoms:  Fever: YES  Chills/Sweats: YES  Headache (location?): YES  Sinus Pressure: no  Conjunctivitis:  no  Ear Pain: no  Rhinorrhea: no  Congestion: no  Sore Throat: YES  Cough: YES-non-productive  Wheeze: no  Decreased Appetite: no  Nausea: no  Vomiting: no  Diarrhea: no  Dysuria/Freq.: no  Dysuria or Hematuria: no  Fatigue/Achiness: YES  Sick/Strep Exposure: no  Therapies tried and outcome: Dayquil- helped      Review of Systems   Constitutional, HEENT, cardiovascular, pulmonary, gi and gu systems are negative, except as otherwise noted.      Objective    Vitals - Patient Reported  Pain Score: No Pain (0)      Vitals:  No vitals were obtained today due to virtual visit.    Physical Exam   healthy, alert and no distress  PSYCH: Alert and oriented times 3; coherent speech, normal   rate and volume, able to articulate logical thoughts, able   to abstract reason, no tangential thoughts, no hallucinations   or delusions  Her affect is normal  RESP: No cough, no audible wheezing, able to talk in full sentences  Remainder of exam unable to be completed due to telephone visits          Phone call duration: 9 minutes

## 2021-10-10 ENCOUNTER — HEALTH MAINTENANCE LETTER (OUTPATIENT)
Age: 36
End: 2021-10-10

## 2021-11-03 ENCOUNTER — OFFICE VISIT (OUTPATIENT)
Dept: OBGYN | Facility: CLINIC | Age: 36
End: 2021-11-03
Payer: COMMERCIAL

## 2021-11-03 VITALS — WEIGHT: 179.5 LBS | DIASTOLIC BLOOD PRESSURE: 70 MMHG | SYSTOLIC BLOOD PRESSURE: 122 MMHG

## 2021-11-03 DIAGNOSIS — N93.9 ABNORMAL UTERINE BLEEDING (AUB): ICD-10-CM

## 2021-11-03 DIAGNOSIS — L68.0 HIRSUTISM: Primary | ICD-10-CM

## 2021-11-03 PROCEDURE — 99214 OFFICE O/P EST MOD 30 MIN: CPT | Performed by: OBSTETRICS & GYNECOLOGY

## 2021-11-03 PROCEDURE — 84443 ASSAY THYROID STIM HORMONE: CPT | Performed by: OBSTETRICS & GYNECOLOGY

## 2021-11-03 PROCEDURE — 84270 ASSAY OF SEX HORMONE GLOBUL: CPT | Performed by: OBSTETRICS & GYNECOLOGY

## 2021-11-03 PROCEDURE — 82627 DEHYDROEPIANDROSTERONE: CPT | Performed by: OBSTETRICS & GYNECOLOGY

## 2021-11-03 PROCEDURE — 80053 COMPREHEN METABOLIC PANEL: CPT | Performed by: OBSTETRICS & GYNECOLOGY

## 2021-11-03 PROCEDURE — 84403 ASSAY OF TOTAL TESTOSTERONE: CPT | Performed by: OBSTETRICS & GYNECOLOGY

## 2021-11-03 PROCEDURE — 36415 COLL VENOUS BLD VENIPUNCTURE: CPT | Performed by: OBSTETRICS & GYNECOLOGY

## 2021-11-03 RX ORDER — NORGESTIMATE AND ETHINYL ESTRADIOL 0.25-0.035
1 KIT ORAL DAILY
Qty: 84 TABLET | Refills: 4 | Status: SHIPPED | OUTPATIENT
Start: 2021-11-03

## 2021-11-03 NOTE — PROGRESS NOTES
SUBJECTIVE:   CC: hirsutism, AUB                                               Kirstin Green is a 36 year old  female who presents to clinic today for new AUB and persistent hirsutism. LMP 10/13 through 10/29, then spotting 2 days later. This is the first episode of prolonged bleeding. Periods have been historically normal though she does report diagnosis with PCOS in [place] in .   She does report hirsutism with increasing coarse hair growth which used to be confined to under her chin on one side, now is across the whole jaw and cheeks. She was previously on oral contraceptives and noted worsening of symptoms since stopping. She stopped because she did not like her most recent formulation, which included desogestrel, she preferred her previous combined oral contraceptives.  US from 2020 does show multiple peripheral follicles.     Problem list and histories reviewed & adjusted, as indicated.  Additional history: as documented.    ROS:  Const: 20lb weight gain in the last year     Patient Active Problem List   Diagnosis     Chronic bilateral low back pain with left-sided sciatica     Past Surgical History:   Procedure Laterality Date     TUBAL LIGATION  2018      Social History     Tobacco Use     Smoking status: Never Smoker     Smokeless tobacco: Never Used   Substance Use Topics     Alcohol use: Never      Problem (# of Occurrences) Relation (Name,Age of Onset)    Breast Cancer (1) Maternal Grandmother            No current outpatient medications on file prior to visit.  No current facility-administered medications on file prior to visit.    Allergies   Allergen Reactions     Quinolones Rash       OBJECTIVE:   /70   Wt 81.4 kg (179 lb 8 oz)   LMP 10/13/2021    Const: sitting in chair in no acute distress, comfortable  HEENT: course hair not visible, has been removed  Eyes: no scleral icterus, EOMI  CV: regular rate, well perfused  Pulm: no increased work of breathing, no cough  Skin: warm and  dry, no rashes/lesions  Psych: mood stable, appropriate affect  Abd: soft, no hepatosplenomegaly, non-tender to palpation  Neuro: A+Ox3     ASSESSMENT/PLAN:                                                    Kirstin Green is a 36 year old female  here for initial gyn evaluation of AUB and hirsutism. I suspect both symptoms arise from PCOS, particularly as they have worsened since she stopped her oral contraceptives.   Discussed polycystic ovarian syndrome with Kirstin Green. Explained that polycystic ovary syndrome (PCOS) is an important cause of both menstrual irregularity and androgen excess in women. Discussed that it can cause irregular menstrual cycles, hirsutism, obesity, insulin resistance, and anovulatory infertility.   Counseled Kirstin Green that diet and exercise for weight reduction are the first steps for overweight and obese women with PCOS. Weight loss can restore ovulatory cycles and improve metabolic risk. Also discussed that a lower carb diet is often beneficial. I recommended that she consider a modified and less strict version of Whole30 or Paleo, or a diabetic diet, with an emphasis on whole, unprocessed foods and high-quality carbohydrate sources, like vegetables, fruits, and complex, unprocessed grains.  Discussed that oral contraceptives (OCs) are the mainstay of pharmacologic therapy for women with PCOS for managing hyperandrogenism, treating menstrual dysfunction and protecting endometrial lining. Additionally, these provide contraception.   For women with PCOS who choose not to or cannot take OCs, we discussed alternative treatments for endometrial protection including intermittent or continuous progestin therapy, or a progestin-releasing intrauterine device (IUD). Depo provera, Nuvaring, the patch, and Nexplanon may also provide this protection in addition to contraception.  Additionally discussed spironolactone as an alternative or addition to manage hirsutism, but reviewed that oral  contraceptives are first line therapy.   Plan for now is to repeat androgen testing and restart ocps.     1. Abnormal uterine bleeding (AUB)  - DHEA sulfate; Future  - Testosterone Free and Total; Future  - Comprehensive metabolic panel; Future  - norgestimate-ethinyl estradiol (ORTHO-CYCLEN, 28,) 0.25-35 MG-MCG tablet; Take 1 tablet by mouth daily  Dispense: 84 tablet; Refill: 4  - TSH with free T4 reflex; Future  - DHEA sulfate  - Testosterone Free and Total  - Comprehensive metabolic panel  - TSH with free T4 reflex  - pelvic US     2. Hirsutism  - DHEA sulfate; Future  - Testosterone Free and Total; Future  - Comprehensive metabolic panel; Future  - norgestimate-ethinyl estradiol (ORTHO-CYCLEN, 28,) 0.25-35 MG-MCG tablet; Take 1 tablet by mouth daily  Dispense: 84 tablet; Refill: 4  - DHEA sulfate  - Testosterone Free and Total  - Comprehensive metabolic panel           Lauryn Hansen MD  Obstetrics and Gynecology   [facility]

## 2021-11-04 LAB
ALBUMIN SERPL-MCNC: 3.8 G/DL (ref 3.4–5)
ALP SERPL-CCNC: 89 U/L (ref 40–150)
ALT SERPL W P-5'-P-CCNC: 29 U/L (ref 0–50)
ANION GAP SERPL CALCULATED.3IONS-SCNC: 7 MMOL/L (ref 3–14)
AST SERPL W P-5'-P-CCNC: 13 U/L (ref 0–45)
BILIRUB SERPL-MCNC: 0.2 MG/DL (ref 0.2–1.3)
BUN SERPL-MCNC: 11 MG/DL (ref 7–30)
CALCIUM SERPL-MCNC: 8.9 MG/DL (ref 8.5–10.1)
CHLORIDE BLD-SCNC: 106 MMOL/L (ref 94–109)
CO2 SERPL-SCNC: 24 MMOL/L (ref 20–32)
CREAT SERPL-MCNC: 0.85 MG/DL (ref 0.52–1.04)
DHEA-S SERPL-MCNC: 206 UG/DL (ref 35–430)
GFR SERPL CREATININE-BSD FRML MDRD: 88 ML/MIN/1.73M2
GLUCOSE BLD-MCNC: 88 MG/DL (ref 70–99)
POTASSIUM BLD-SCNC: 3.8 MMOL/L (ref 3.4–5.3)
PROT SERPL-MCNC: 7.9 G/DL (ref 6.8–8.8)
SHBG SERPL-SCNC: 29 NMOL/L (ref 30–135)
SODIUM SERPL-SCNC: 137 MMOL/L (ref 133–144)
TSH SERPL DL<=0.005 MIU/L-ACNC: 2.2 MU/L (ref 0.4–4)

## 2021-11-04 NOTE — PATIENT INSTRUCTIONS
Consider laser hair removal for unwanted coarse facial hair.     Consider dermatology consultation for hair lightening techniques.

## 2021-11-07 LAB
SHBG SERPL-SCNC: 29 NMOL/L (ref 30–135)
TESTOST FREE SERPL-MCNC: 1.33 NG/DL
TESTOST SERPL-MCNC: 68 NG/DL (ref 8–60)

## 2021-11-17 ENCOUNTER — ANCILLARY PROCEDURE (OUTPATIENT)
Dept: ULTRASOUND IMAGING | Facility: CLINIC | Age: 36
End: 2021-11-17
Attending: OBSTETRICS & GYNECOLOGY
Payer: COMMERCIAL

## 2021-11-17 DIAGNOSIS — N93.9 ABNORMAL UTERINE BLEEDING (AUB): ICD-10-CM

## 2021-11-17 PROCEDURE — 76830 TRANSVAGINAL US NON-OB: CPT | Performed by: OBSTETRICS & GYNECOLOGY

## 2021-11-17 PROCEDURE — 76856 US EXAM PELVIC COMPLETE: CPT | Performed by: OBSTETRICS & GYNECOLOGY

## 2022-01-30 ENCOUNTER — HEALTH MAINTENANCE LETTER (OUTPATIENT)
Age: 37
End: 2022-01-30

## 2022-09-18 ENCOUNTER — HEALTH MAINTENANCE LETTER (OUTPATIENT)
Age: 37
End: 2022-09-18

## 2023-03-02 NOTE — LETTER
8/7/2020         RE: Kirstin Green  [address]  [place] [address]        Dear Colleague,    [facility] Please see a copy of my visit note below.    HPI:   Chief complaints: Kirstin Green is a 34 year old female who presents for evaluation of hair loss. She has noticed thinning along the front of her scalp for the past 3 years. The density seems to be the same at the back of the scalp and on the sides. NO pain on the scalp.   She has also noticed increased hair growth on the face which has been happening for about the same amount of time.   Condition present for:  3 years.   Previous treatments include: none  -pmhx: recent US which revealed likely PCOS. Labs were normal.   She is still nursing; has a 19 month old and a 9 year old    Review Of Systems  Eyes: negative  Ears/Nose/Throat: negative  Respiratory: No shortness of breath, dyspnea on exertion, cough, or hemoptysis  Cardiovascular: negative  Gastrointestinal: negative  Genitourinary: negative  Musculoskeletal: negative  Neurologic: negative  Psychiatric: negative  Skin: positive for hair loss       PHYSICAL EXAM:    BP 95/59   Pulse 76   SpO2 99%   Breastfeeding Yes   Skin exam performed as follows: Type 4 skin. Mood appropriate  Alert and Oriented X 3. Well developed, well nourished in no distress.  General appearance: Normal  Head including face: Normal  Eyes: conjunctiva and lids: Normal  Mouth: Lips, teeth, gums: Normal  Neck: Normal  Chest-breast/axillae: Normal  Back: Normal  Spleen and liver: Normal  Cardiovascular: Exam of peripheral vascular system by observation for swelling, varicosities, edema: Normal  Genitalia: groin, buttocks: Normal  Extremities: digits/nails (clubbing): Normal  Eccrine and Apocrine glands: Normal  Right upper extremity: Normal  Left upper extremity: Normal  Right lower extremity: Normal  Left lower extremity: Normal  Skin:  Scalp and body hair: See below    1. Decreased density through vertex scalp. Scalp normal without erythema or scaling. Negative hair pull.     ASSESSMENT/PLAN:     1. Female pattern hair loss. Advised on natural course and progression secondary to hormones and genetics. Discussed options for treatment including OTC minoxidil, spironolactone and finasteride. She is still nursing; advised she can call for medications once she is done with this.   2. Hirsutism - discussed that spironolactone may help with this.         Follow-up: yearly  CC:   Scribed By: Ayala Fung, MS, PALizetteC        Again, thank you for allowing me to participate in the care of your patient.        Sincerely,        Ayala Fung PA-C     temporal

## 2023-05-08 ENCOUNTER — HEALTH MAINTENANCE LETTER (OUTPATIENT)
Age: 38
End: 2023-05-08

## 2024-07-14 ENCOUNTER — HEALTH MAINTENANCE LETTER (OUTPATIENT)
Age: 39
End: 2024-07-14